# Patient Record
Sex: FEMALE | Race: BLACK OR AFRICAN AMERICAN | Employment: FULL TIME | URBAN - METROPOLITAN AREA
[De-identification: names, ages, dates, MRNs, and addresses within clinical notes are randomized per-mention and may not be internally consistent; named-entity substitution may affect disease eponyms.]

---

## 2020-05-05 ENCOUNTER — HOSPITAL ENCOUNTER (OUTPATIENT)
Dept: GENERAL RADIOLOGY | Age: 20
Discharge: HOME | End: 2020-05-05

## 2020-05-05 ENCOUNTER — HOSPITAL ENCOUNTER (OUTPATIENT)
Age: 20
Discharge: HOME | End: 2020-05-05

## 2020-05-05 PROCEDURE — 71046 X-RAY EXAM CHEST 2 VIEWS: CPT

## 2021-05-25 ENCOUNTER — HOSPITAL ENCOUNTER (OUTPATIENT)
Age: 21
Discharge: HOME | End: 2021-05-25
Payer: COMMERCIAL

## 2021-05-25 ENCOUNTER — OFFICE VISIT (OUTPATIENT)
Dept: BARIATRICS/WEIGHT MGMT | Age: 21
End: 2021-05-25
Payer: COMMERCIAL

## 2021-05-25 ENCOUNTER — HOSPITAL ENCOUNTER (OUTPATIENT)
Age: 21
Setting detail: SPECIMEN
Discharge: HOME | End: 2021-05-25
Payer: COMMERCIAL

## 2021-05-25 VITALS
WEIGHT: 262.3 LBS | HEART RATE: 96 BPM | SYSTOLIC BLOOD PRESSURE: 120 MMHG | OXYGEN SATURATION: 100 % | HEIGHT: 69 IN | BODY MASS INDEX: 38.85 KG/M2 | DIASTOLIC BLOOD PRESSURE: 80 MMHG

## 2021-05-25 DIAGNOSIS — Z79.899 MEDICATION MANAGEMENT: ICD-10-CM

## 2021-05-25 DIAGNOSIS — Z79.899 MEDICATION MANAGEMENT: Primary | ICD-10-CM

## 2021-05-25 DIAGNOSIS — E66.01 MORBID OBESITY DUE TO EXCESS CALORIES (HCC): ICD-10-CM

## 2021-05-25 LAB
ALBUMIN SERPL-MCNC: 4.2 GM/DL (ref 3.4–5)
ALP BLD-CCNC: 52 IU/L (ref 40–128)
ALT SERPL-CCNC: 10 U/L (ref 10–40)
AMPHETAMINES: NEGATIVE
ANION GAP SERPL CALCULATED.3IONS-SCNC: 10 MMOL/L (ref 4–16)
AST SERPL-CCNC: 12 IU/L (ref 15–37)
BARBITURATE SCREEN URINE: NEGATIVE
BASOPHILS ABSOLUTE: 0 K/CU MM
BASOPHILS RELATIVE PERCENT: 0.5 % (ref 0–1)
BENZODIAZEPINE SCREEN, URINE: NEGATIVE
BILIRUB SERPL-MCNC: 0.2 MG/DL (ref 0–1)
BUN BLDV-MCNC: 6 MG/DL (ref 6–23)
CALCIUM SERPL-MCNC: 9.6 MG/DL (ref 8.3–10.6)
CANNABINOID SCREEN URINE: NEGATIVE
CHLORIDE BLD-SCNC: 105 MMOL/L (ref 99–110)
CO2: 25 MMOL/L (ref 21–32)
COCAINE METABOLITE: NEGATIVE
CREAT SERPL-MCNC: 0.6 MG/DL (ref 0.6–1.1)
DIFFERENTIAL TYPE: ABNORMAL
EKG ATRIAL RATE: 80 BPM
EKG DIAGNOSIS: NORMAL
EKG P AXIS: 68 DEGREES
EKG P-R INTERVAL: 146 MS
EKG Q-T INTERVAL: 360 MS
EKG QRS DURATION: 74 MS
EKG QTC CALCULATION (BAZETT): 415 MS
EKG R AXIS: 60 DEGREES
EKG T AXIS: 36 DEGREES
EKG VENTRICULAR RATE: 80 BPM
EOSINOPHILS ABSOLUTE: 0.2 K/CU MM
EOSINOPHILS RELATIVE PERCENT: 2.7 % (ref 0–3)
GFR AFRICAN AMERICAN: >60 ML/MIN/1.73M2
GFR NON-AFRICAN AMERICAN: >60 ML/MIN/1.73M2
GLUCOSE BLD-MCNC: 88 MG/DL (ref 70–99)
HCT VFR BLD CALC: 41.6 % (ref 37–47)
HEMOGLOBIN: 13.2 GM/DL (ref 12.5–16)
IMMATURE NEUTROPHIL %: 0.3 % (ref 0–0.43)
INTERPRETATION: ABNORMAL
LYMPHOCYTES ABSOLUTE: 2 K/CU MM
LYMPHOCYTES RELATIVE PERCENT: 33.4 % (ref 24–44)
MCH RBC QN AUTO: 30 PG (ref 27–31)
MCHC RBC AUTO-ENTMCNC: 31.7 % (ref 32–36)
MCV RBC AUTO: 94.5 FL (ref 78–100)
MONOCYTES ABSOLUTE: 0.9 K/CU MM
MONOCYTES RELATIVE PERCENT: 14.8 % (ref 0–4)
NUCLEATED RBC %: 0 %
OPIATES, URINE: NEGATIVE
OXYCODONE: NEGATIVE
PDW BLD-RTO: 12.3 % (ref 11.7–14.9)
PHENCYCLIDINE, URINE: NEGATIVE
PLATELET # BLD: 308 K/CU MM (ref 140–440)
PMV BLD AUTO: 9.7 FL (ref 7.5–11.1)
POTASSIUM SERPL-SCNC: 4.2 MMOL/L (ref 3.5–5.1)
PREGNANCY, URINE: NEGATIVE
RBC # BLD: 4.4 M/CU MM (ref 4.2–5.4)
SEGMENTED NEUTROPHILS ABSOLUTE COUNT: 2.9 K/CU MM
SEGMENTED NEUTROPHILS RELATIVE PERCENT: 48.3 % (ref 36–66)
SODIUM BLD-SCNC: 140 MMOL/L (ref 135–145)
SPECIFIC GRAVITY, URINE: 1.04 (ref 1–1.03)
TOTAL IMMATURE NEUTOROPHIL: 0.02 K/CU MM
TOTAL NUCLEATED RBC: 0 K/CU MM
TOTAL PROTEIN: 7.6 GM/DL (ref 6.4–8.2)
WBC # BLD: 6 K/CU MM (ref 4–10.5)

## 2021-05-25 PROCEDURE — G8427 DOCREV CUR MEDS BY ELIG CLIN: HCPCS | Performed by: SURGERY

## 2021-05-25 PROCEDURE — 1036F TOBACCO NON-USER: CPT | Performed by: SURGERY

## 2021-05-25 PROCEDURE — 85025 COMPLETE CBC W/AUTO DIFF WBC: CPT

## 2021-05-25 PROCEDURE — 36415 COLL VENOUS BLD VENIPUNCTURE: CPT

## 2021-05-25 PROCEDURE — 81025 URINE PREGNANCY TEST: CPT

## 2021-05-25 PROCEDURE — 93005 ELECTROCARDIOGRAM TRACING: CPT | Performed by: SURGERY

## 2021-05-25 PROCEDURE — 99203 OFFICE O/P NEW LOW 30 MIN: CPT | Performed by: SURGERY

## 2021-05-25 PROCEDURE — 80053 COMPREHEN METABOLIC PANEL: CPT

## 2021-05-25 PROCEDURE — 93010 ELECTROCARDIOGRAM REPORT: CPT | Performed by: INTERNAL MEDICINE

## 2021-05-25 PROCEDURE — G8419 CALC BMI OUT NRM PARAM NOF/U: HCPCS | Performed by: SURGERY

## 2021-05-25 PROCEDURE — 80307 DRUG TEST PRSMV CHEM ANLYZR: CPT

## 2021-05-25 RX ORDER — BLOOD PRESSURE TEST KIT
KIT MISCELLANEOUS
Qty: 1 EACH | Refills: 0 | Status: SHIPPED | OUTPATIENT
Start: 2021-05-25 | End: 2021-08-24

## 2021-05-25 RX ORDER — LIRAGLUTIDE 6 MG/ML
INJECTION, SOLUTION SUBCUTANEOUS
Qty: 1 PEN | Refills: 0 | Status: SHIPPED | OUTPATIENT
Start: 2021-05-25 | End: 2021-08-24 | Stop reason: ALTCHOICE

## 2021-05-25 NOTE — PROGRESS NOTES
Subjective     Chief Complaint   Patient presents with    Weight Management     new [facility]        Vitals:    05/25/21 1007   BP: 120/80   Pulse: 96   SpO2: 100%     Wt Readings from Last 3 Encounters:   05/25/21 262 lb 4.8 oz (119 kg)         HPI:    Psychiatric history: denies  Cardiac history: denies  ETOH: denies  Drug use or history: denies  Exercise: not regularly   Current Diet measures: denies  Previous weight loss medications: adipex. Was on adipex two years ago for one week. Pt was on adipex for a week but had racing heart. History or seizure: denies  History of kidney stones: denies  History of eating disorders: denies  Contraception: pt had last depo shot in October but hasn't had a period yet. Domi's life is affected by weight related to below comorbid conditions. The patient has also tried self directed diet and exercise in attempts to sustain weight loss. Comorbid Conditions:  Significant diseases effecting this patient are History reviewed. No pertinent past medical history. .    Thoroughly reviewed the patient's medical history, family history, social history and review of systems with the patient today in the office. Please see medical record for pertinent positives. Allergies:  No Known Allergies    Past Surgical History:  History reviewed. No pertinent surgical history. Family History:  History reviewed. No pertinent family history.    + DM, +CA in father (prostate), +HTN,     Social History:  Social History     Socioeconomic History    Marital status: Single     Spouse name: Not on file    Number of children: Not on file    Years of education: Not on file    Highest education level: Not on file   Occupational History    Not on file   Tobacco Use    Smoking status: Never Smoker    Smokeless tobacco: Never Used   Substance and Sexual Activity    Alcohol use: Not on file    Drug use: Not on file    Sexual activity: Not on file   Other Topics Concern    Not on file Social History Narrative    Not on file     Social Determinants of Health     Financial Resource Strain:     Difficulty of Paying Living Expenses:    Food Insecurity:     Worried About Running Out of Food in the Last Year:     [address] in the Last Year:    Transportation Needs:     Lack of Transportation (Medical):  Lack of Transportation (Non-Medical):    Physical Activity:     Days of Exercise per Week:     Minutes of Exercise per Session:    Stress:     Feeling of Stress :    Social Connections:     Frequency of Communication with Friends and Family:     Frequency of Social Gatherings with Friends and Family:     Attends [employer] Services:     Active Member of Clubs or Organizations:     Attends Club or Organization Meetings:     Marital Status:    Intimate Partner Violence:     Fear of Current or Ex-Partner:     Emotionally Abused:     Physically Abused:     Sexually Abused:        Review of Systems - History obtained from the patient.     Review of Systems - General ROS: negative for - chills, fever, hot flashes or night sweats  ENT ROS: negative for - headaches, oral lesions, sore throat, vertigo or visual changes  Allergy and Immunology ROS: negative for - hives or nasal congestion  Endocrine ROS: negative for - hair pattern changes, mood swings or polydipsia/polyuria  Respiratory ROS: no cough, shortness of breath, or wheezing  Cardiovascular ROS: no chest pain or dyspnea on exertion  Gastrointestinal ROS: no abdominal pain, change in bowel habits, or black or bloody stools  Genito-Urinary ROS: no dysuria, incontinence, trouble voiding, or hematuria  Musculoskeletal ROS: Negative for joint pain or myalgia  Neurological ROS: negative for - behavioral changes, dizziness, headaches, impaired coordination/balance, numbness/tingling or seizures  Dermatological ROS: negative for - eczema or nail changes  Psychological ROS: negative for - anxiety, depression or suicidal ideation Objective     Physical Exam   Constitutional: Oriented to person, place, and time and well-developed, well-nourished, and in no distress. No distress. Obese   HENT:   Head: Normocephalic and atraumatic. Eyes: Conjunctivae are normal. Pupils are equal, round, and reactive to light. Neck: Normal range of motion. Neck supple. Cardiovascular: Normal rate, regular rhythm, normal heart sounds and intact distal pulses. No murmur heard. Pulmonary/Chest: Effort normal and breath sounds normal. No respiratory distress. Abdominal: Soft. Bowel sounds are normal. Exhibits no distension. There is no tenderness. Large. Musculoskeletal: Exhibits no edema or tenderness. Lymphadenopathy: Deferred. Neurological: Patient is alert and oriented to person, place, and time. No cranial nerve deficit. Skin: Skin is warm. Patient is not diaphoretic. Psychiatric: Mood, memory, affect and judgment normal.     Assessment  and Plan    Plan    1. Medication management  -first line plan is saxenda. Second line would be adipex. Orders placed for both in case not approved for saxenda. - POCT urine pregnancy  - DRUG SCREEN MULTI URINE; Future  - EKG 12 lead; Future  - CBC Auto Differential; Future  - COMPREHENSIVE METABOLIC PANEL; Future    2. Morbid obesity due to excess calories ([address]  -1200 mell / day  -Pt encouraged to continue exercising. Minimum goal of 150 minutes per week with ideal goal of 300 min per week. Exercise regimen should include at least 2-3 sessions per week of strength training. These recommendations are current with the most recent ASMBS guidelines. Obesity with a BMI of Body mass index is 38.73 kg/m². No history of current insulin use- or other Contraindications: family history of medullary Thyroid Carcinoma (MTC), multiple endocrine neoplasia syndrome, pregnancy, gastroparesis, problems with pancreas, kidneys, liver, depression and/or suicidal thoughts.  Was studied in >71 years of age and around Reason for Exam?     Answer: Other     Comments:   med mgt       Follow Up:  No follow-ups on file.     Mitch Townsend MD

## 2021-08-24 ENCOUNTER — HOSPITAL ENCOUNTER (OUTPATIENT)
Age: 21
Setting detail: SPECIMEN
Discharge: HOME | End: 2021-08-24
Payer: COMMERCIAL

## 2021-08-24 ENCOUNTER — OFFICE VISIT (OUTPATIENT)
Dept: OBGYN | Age: 21
End: 2021-08-24
Payer: COMMERCIAL

## 2021-08-24 VITALS
WEIGHT: 282 LBS | DIASTOLIC BLOOD PRESSURE: 76 MMHG | HEIGHT: 69 IN | BODY MASS INDEX: 41.77 KG/M2 | SYSTOLIC BLOOD PRESSURE: 129 MMHG

## 2021-08-24 DIAGNOSIS — E66.01 MORBID OBESITY (HCC): ICD-10-CM

## 2021-08-24 DIAGNOSIS — N92.6 IRREGULAR MENSES: ICD-10-CM

## 2021-08-24 DIAGNOSIS — A60.00 GENITAL HERPES SIMPLEX, UNSPECIFIED SITE: ICD-10-CM

## 2021-08-24 DIAGNOSIS — Z01.419 WOMEN'S ANNUAL ROUTINE GYNECOLOGICAL EXAMINATION: Primary | ICD-10-CM

## 2021-08-24 LAB
BASOPHILS ABSOLUTE: 0 K/UL (ref 0–0.2)
BASOPHILS RELATIVE PERCENT: 0.4 %
CHOLESTEROL, TOTAL: 140 MG/DL (ref 0–199)
EOSINOPHILS ABSOLUTE: 0.1 K/UL (ref 0–0.6)
EOSINOPHILS RELATIVE PERCENT: 1.5 %
FOLLICLE STIMULATING HORMONE: 6.2 MIU/ML
GLUCOSE BLD-MCNC: 75 MG/DL (ref 70–99)
HCT VFR BLD CALC: 37 % (ref 36–48)
HDLC SERPL-MCNC: 39 MG/DL (ref 40–60)
HEMOGLOBIN: 12.5 G/DL (ref 12–16)
LDL CHOLESTEROL CALCULATED: 82 MG/DL
LYMPHOCYTES ABSOLUTE: 2.4 K/UL (ref 1–5.1)
LYMPHOCYTES RELATIVE PERCENT: 37.4 %
MCH RBC QN AUTO: 31 PG (ref 26–34)
MCHC RBC AUTO-ENTMCNC: 33.8 G/DL (ref 31–36)
MCV RBC AUTO: 91.6 FL (ref 80–100)
MONOCYTES ABSOLUTE: 0.6 K/UL (ref 0–1.3)
MONOCYTES RELATIVE PERCENT: 10 %
NEUTROPHILS ABSOLUTE: 3.3 K/UL (ref 1.7–7.7)
NEUTROPHILS RELATIVE PERCENT: 50.7 %
PDW BLD-RTO: 13.3 % (ref 12.4–15.4)
PLATELET # BLD: 264 K/UL (ref 135–450)
PMV BLD AUTO: 8.1 FL (ref 5–10.5)
PROLACTIN: 11.6 NG/ML
RBC # BLD: 4.04 M/UL (ref 4–5.2)
TRIGL SERPL-MCNC: 93 MG/DL (ref 0–150)
TSH REFLEX FT4: 1.49 UIU/ML (ref 0.27–4.2)
VLDLC SERPL CALC-MCNC: 19 MG/DL
WBC # BLD: 6.5 K/UL (ref 4–11)

## 2021-08-24 PROCEDURE — 88142 CYTOPATH C/V THIN LAYER: CPT

## 2021-08-24 PROCEDURE — 87801 DETECT AGNT MULT DNA AMPLI: CPT

## 2021-08-24 PROCEDURE — 36415 COLL VENOUS BLD VENIPUNCTURE: CPT | Performed by: OBSTETRICS & GYNECOLOGY

## 2021-08-24 PROCEDURE — 99385 PREV VISIT NEW AGE 18-39: CPT | Performed by: OBSTETRICS & GYNECOLOGY

## 2021-08-24 RX ORDER — VALACYCLOVIR HYDROCHLORIDE 1 G/1
1000 TABLET, FILM COATED ORAL 2 TIMES DAILY
COMMUNITY
End: 2021-08-24

## 2021-08-24 RX ORDER — MEDROXYPROGESTERONE ACETATE 10 MG/1
20 TABLET ORAL DAILY
Qty: 20 TABLET | Refills: 0 | Status: SHIPPED | OUTPATIENT
Start: 2021-08-24 | End: 2022-07-26

## 2021-08-24 RX ORDER — VALACYCLOVIR HYDROCHLORIDE 1 G/1
1000 TABLET, FILM COATED ORAL DAILY
Qty: 90 TABLET | Refills: 3 | Status: SHIPPED | OUTPATIENT
Start: 2021-08-24 | End: 2022-07-26

## 2021-08-24 SDOH — ECONOMIC STABILITY: FOOD INSECURITY: WITHIN THE PAST 12 MONTHS, THE FOOD YOU BOUGHT JUST DIDN'T LAST AND YOU DIDN'T HAVE MONEY TO GET MORE.: NEVER TRUE

## 2021-08-24 SDOH — ECONOMIC STABILITY: FOOD INSECURITY: WITHIN THE PAST 12 MONTHS, YOU WORRIED THAT YOUR FOOD WOULD RUN OUT BEFORE YOU GOT MONEY TO BUY MORE.: NEVER TRUE

## 2021-08-24 ASSESSMENT — SOCIAL DETERMINANTS OF HEALTH (SDOH): HOW HARD IS IT FOR YOU TO PAY FOR THE VERY BASICS LIKE FOOD, HOUSING, MEDICAL CARE, AND HEATING?: NOT HARD AT ALL

## 2021-08-24 ASSESSMENT — PATIENT HEALTH QUESTIONNAIRE - PHQ9
2. FEELING DOWN, DEPRESSED OR HOPELESS: 0
SUM OF ALL RESPONSES TO PHQ9 QUESTIONS 1 & 2: 0
1. LITTLE INTEREST OR PLEASURE IN DOING THINGS: 0
SUM OF ALL RESPONSES TO PHQ QUESTIONS 1-9: 0

## 2021-08-24 ASSESSMENT — ENCOUNTER SYMPTOMS
ALLERGIC/IMMUNOLOGIC NEGATIVE: 1
RESPIRATORY NEGATIVE: 1
GASTROINTESTINAL NEGATIVE: 1
EYES NEGATIVE: 1

## 2021-08-24 NOTE — PROGRESS NOTES
8/24/21    Jann Dewitt  2000    Chief Complaint   Patient presents with    Gynecologic Exam     irregular menses, last depo injection was in October, not sexually active, unsure when last pap smear was, results normal,     Amenorrhea     last depo was in Feb/2020        The patient is a 23 y.o. female, No obstetric history on file. who presents for her annual exam.  She is not menstruating. .  She is  sexually active. She is not currently taking birth control    She reports additional symptoms of missed menses. .      Pap smear history: She has not had a PAP smear in the past.    History reviewed. No pertinent past medical history. History reviewed. No pertinent surgical history. History reviewed. No pertinent family history. Social History     Tobacco Use    Smoking status: Never Smoker    Smokeless tobacco: Never Used   Vaping Use    Vaping Use: Never used   Substance Use Topics    Alcohol use: Never    Drug use: Never       Current Outpatient Medications   Medication Sig Dispense Refill    valACYclovir (VALTREX) 1 g tablet Take 1 tablet by mouth daily 90 tablet 3    medroxyPROGESTERone (PROVERA) 10 MG tablet Take 2 tablets by mouth daily for 10 days 20 tablet 0     No current facility-administered medications for this visit. No Known Allergies    No obstetric history on file. There is no immunization history on file for this patient. Review of Systems   Constitutional: Negative. Eyes: Negative. Respiratory: Negative. Cardiovascular: Negative. Gastrointestinal: Negative. Endocrine: Negative. Genitourinary: Negative. Musculoskeletal: Negative. Skin: Negative. Allergic/Immunologic: Negative. Neurological: Negative. Hematological: Negative. Psychiatric/Behavioral: Negative. /76   Ht 5' 9\" (1.753 m)   Wt 282 lb (127.9 kg)   BMI 41.64 kg/m²     Physical Exam  Exam conducted with a chaperone present.    Constitutional: Appearance: Normal appearance. HENT:      Head: Normocephalic and atraumatic. Nose: Nose normal.   Eyes:      Conjunctiva/sclera: Conjunctivae normal.   Cardiovascular:      Rate and Rhythm: Normal rate. Pulses: Normal pulses. Pulmonary:      Effort: Pulmonary effort is normal.   Chest:      Breasts:         Right: Normal.         Left: Normal.   Abdominal:      General: Abdomen is flat. Bowel sounds are normal.      Palpations: Abdomen is soft. Hernia: There is no hernia in the left inguinal area or right inguinal area. Genitourinary:     General: Normal vulva. Exam position: Lithotomy position. Labia:         Right: No rash, tenderness or lesion. Left: No rash, tenderness or lesion. Urethra: No prolapse. Vagina: Normal. No foreign body. No vaginal discharge, erythema, tenderness, bleeding, lesions or prolapsed vaginal walls. Cervix: No cervical motion tenderness, discharge, friability, lesion, erythema or cervical bleeding. Uterus: Normal. Not enlarged, not tender and no uterine prolapse. Adnexa: Right adnexa normal and left adnexa normal.        Right: No mass, tenderness or fullness. Left: No mass, tenderness or fullness. Musculoskeletal:      Cervical back: Normal range of motion and neck supple. Lymphadenopathy:      Upper Body:      Right upper body: No supraclavicular, axillary or pectoral adenopathy. Left upper body: No supraclavicular, axillary or pectoral adenopathy. Skin:     General: Skin is warm. Coloration: Skin is not ashen or cyanotic. Findings: No abrasion, abscess or bruising. Rash is not crusting or macular. Neurological:      Mental Status: She is alert and oriented to person, place, and time. No results found for this visit on 08/24/21. Assessment and Plan   Diagnosis Orders   1.  Women's annual routine gynecological examination  C.trachomatis N.gonorrhoeae DNA, Thin Prep    PAP SMEAR 2. Morbid obesity (HCC)  Lipid Panel    TSH WITH REFLEX TO FT4    Glucose    Hemoglobin A1C   3. Irregular menses  CBC Auto Differential    DHEA-Sulfate    Insulin, total    Prolactin    TSH with Reflex    Testosterone, free, total    Follicle Stimulating Hormone    PROLACTIN   4. Genital herpes simplex, unspecified site       Follow up 1 month      Return in about 1 year (around 8/24/2022).     Dolores President, MD

## 2021-08-25 LAB
ESTIMATED AVERAGE GLUCOSE: 111.2 MG/DL
HBA1C MFR BLD: 5.5 %

## 2021-08-26 LAB
INSULIN COMMENT: NORMAL
INSULIN REFERENCE RANGE:: NORMAL
INSULIN: 42.4 MU/L
SEX HORMONE BINDING GLOBULIN: 16 NMOL/L (ref 30–135)
TESTOSTERONE FREE-NONMALE: 3.6 PG/ML (ref 0.8–7.4)
TESTOSTERONE TOTAL: 14 NG/DL (ref 20–70)

## 2021-08-27 LAB — DHEAS (DHEA SULFATE): 156 UG/DL (ref 65–380)

## 2021-08-28 LAB
CHLAMYDIA BY THIN PREP: NEGATIVE
GC BY THIN PREP: NEGATIVE

## 2021-09-27 ENCOUNTER — OFFICE VISIT (OUTPATIENT)
Dept: OBGYN | Age: 21
End: 2021-09-27
Payer: COMMERCIAL

## 2021-09-27 VITALS
WEIGHT: 276 LBS | SYSTOLIC BLOOD PRESSURE: 123 MMHG | HEIGHT: 69 IN | HEART RATE: 118 BPM | DIASTOLIC BLOOD PRESSURE: 81 MMHG | BODY MASS INDEX: 40.88 KG/M2

## 2021-09-27 DIAGNOSIS — Z87.42 HISTORY OF MENORRHAGIA: ICD-10-CM

## 2021-09-27 DIAGNOSIS — E66.01 MORBID OBESITY (HCC): ICD-10-CM

## 2021-09-27 DIAGNOSIS — N92.6 IRREGULAR MENSES: Primary | ICD-10-CM

## 2021-09-27 PROCEDURE — 99214 OFFICE O/P EST MOD 30 MIN: CPT | Performed by: OBSTETRICS & GYNECOLOGY

## 2021-09-27 RX ORDER — PHENTERMINE HYDROCHLORIDE 37.5 MG/1
37.5 TABLET ORAL
Qty: 84 TABLET | Refills: 3 | Status: SHIPPED | OUTPATIENT
Start: 2021-09-27 | End: 2021-10-26 | Stop reason: SDUPTHER

## 2021-09-27 RX ORDER — NORETHINDRONE ACETATE AND ETHINYL ESTRADIOL 1; .02 MG/1; MG/1
1 TABLET ORAL DAILY
Qty: 21 TABLET | Refills: 3 | Status: SHIPPED | OUTPATIENT
Start: 2021-09-27 | End: 2021-10-26 | Stop reason: SDUPTHER

## 2021-09-27 ASSESSMENT — ENCOUNTER SYMPTOMS
GASTROINTESTINAL NEGATIVE: 1
EYES NEGATIVE: 1
ALLERGIC/IMMUNOLOGIC NEGATIVE: 1
RESPIRATORY NEGATIVE: 1

## 2021-09-27 NOTE — PROGRESS NOTES
9/27/21    Esperanza Cody  2000    Chief Complaint   Patient presents with    Follow-up     pt here to discuss labs. pt would like to discuss weight.  Amenorrhea        Esperanza Cody is a 23 y.o. female who presents today for evaluation of morbid obesity and irregular menses. Is exercising and is eating healthy. Menses are rare. LMP 2 days prior but prior to this was 2 years prior. Was on depo provera. Also gained weight on depoprovera. Prior to depo provera menses were heavy but once month. Menses were bright red with clots, lasting 7-11 days    No past medical history on file. No past surgical history on file. Social History     Tobacco Use    Smoking status: Never Smoker    Smokeless tobacco: Never Used   Vaping Use    Vaping Use: Never used   Substance Use Topics    Alcohol use: Never    Drug use: Never       No family history on file. Current Outpatient Medications   Medication Sig Dispense Refill    phentermine (ADIPEX-P) 37.5 MG tablet Take 1 tablet by mouth every morning (before breakfast) for 30 days. bmi 40.76 84 tablet 3    norethindrone-ethinyl estradiol (LOESTRIN 1/20, 21,) 1-20 MG-MCG per tablet Take 1 tablet by mouth daily (For the first two pill packs, take for 3 week and then 1 week off pills. Starting with the third pill pack, take active pills continously) 21 tablet 3    valACYclovir (VALTREX) 1 g tablet Take 1 tablet by mouth daily 90 tablet 3    medroxyPROGESTERone (PROVERA) 10 MG tablet Take 2 tablets by mouth daily for 10 days 20 tablet 0     No current facility-administered medications for this visit. No Known Allergies    No obstetric history on file. There is no immunization history on file for this patient. Review of Systems   Constitutional: Negative. Eyes: Negative. Respiratory: Negative. Cardiovascular: Negative. Gastrointestinal: Negative. Endocrine: Negative. Genitourinary: Positive for menstrual problem. Musculoskeletal: Negative. Skin: Negative. Allergic/Immunologic: Negative. Neurological: Negative. Hematological: Negative. Psychiatric/Behavioral: Negative. /81 (Site: Right Upper Arm, Position: Sitting, Cuff Size: Medium Adult)   Pulse 118   Ht 5' 9\" (1.753 m)   Wt 276 lb (125.2 kg)   LMP 09/22/2021   BMI 40.76 kg/m²     Physical Exam  Constitutional:       General: She is not in acute distress. Appearance: Normal appearance. She is not ill-appearing. HENT:      Head: Normocephalic. Nose: Nose normal.   Eyes:      General: No scleral icterus. Right eye: No discharge. Left eye: No discharge. Cardiovascular:      Pulses: Normal pulses. Pulmonary:      Effort: Pulmonary effort is normal.   Abdominal:      General: Abdomen is flat. There is no distension. Palpations: Abdomen is soft. There is no mass. Tenderness: There is no abdominal tenderness. Hernia: No hernia is present. Musculoskeletal:         General: Normal range of motion. Cervical back: Normal range of motion and neck supple. No rigidity. Skin:     General: Skin is warm and dry. Neurological:      General: No focal deficit present. Mental Status: She is alert and oriented to person, place, and time.    Psychiatric:         Mood and Affect: Mood normal.         Behavior: Behavior normal.       ollected Updated Procedure    08/24/2021 1700 08/24/2021 2211 TSH WITH REFLEX TO FT4 [[id number]]    Blood    Component Value Units   TSH Reflex FT4 1.49 uIU/mL           08/24/2021 1700 08/24/2021 2209 Lipid Panel [[id number]]    (Abnormal)   Blood    Component Value Units   Cholesterol, Total 140 mg/dL   Triglycerides 93 mg/dL   HDL 39Low  mg/dL   LDL Calculated 82 mg/dL   VLDL Cholesterol Calculated 19 mg/dL           08/24/2021 1700 08/24/2021 2128 CBC Auto Differential [[id number]]    Blood    Component Value Units   WBC 6.5 K/uL   RBC 4.04 M/uL   Hemoglobin 12.5 g/dL Hematocrit 37.0 %   MCV 91.6 fL   MCH 31.0 pg   MCHC 33.8 g/dL   RDW 13.3 %   Platelets 437 K/uL   MPV 8.1 fL   Neutrophils % 50.7 %   Lymphocytes % 37.4 %   Monocytes % 10.0 %   Eosinophils % 1.5 %   Basophils % 0.4 %   Neutrophils Absolute 3.3 K/uL   Lymphocytes Absolute 2.4 K/uL   Monocytes Absolute 0.6 K/uL   Eosinophils Absolute 0.1 K/uL   Basophils Absolute 0.0 K/uL           08/24/2021 1700 08/24/2021 2209 Glucose [[id number]]    Blood    Component Value Units   Glucose 75 mg/dL           08/24/2021 1700 08/25/2021 0819 Hemoglobin A1C [[id number]]    Blood    Component Value Units   Hemoglobin A1C 5.5  %   eAG 111.2 mg/dL           08/24/2021 1700 08/27/2021 1253 DHEA-Sulfate [[id number]]    Blood    Component Value Units   DHEAS (DHEA Sulfate) 156.0  ug/dL           08/24/2021 1700 08/26/2021 1556 Insulin, total [[id number]]    Blood    Component Value Units   Insulin Comment na    Insulin 42.4 mU/L   Insulin Reference Range: SEE BELOW             08/24/2021 1700 08/24/2021 2209 Prolactin [[id number]]    Blood    Component Value Units   Prolactin 11.6  ng/mL           08/24/2021 1700 08/26/2021 1608 Testosterone, free, total [[id number]]    (Abnormal)   Blood    Component Value Units   Testosterone 14Low  ng/dL   Sex Hormone Binding 16Low  nmol/L   Testosterone, Free 3.6  pg/mL           08/24/2021 1700 87/61/5765 5917 Follicle Stimulating Hormone [[id number]]    Blood    Component Value Units   FSH 6.2  mIU/mL           08/24/2021 1727 08/31/2021 1140 GYN Cytology [[id number]]  Component Value   No component results               No results found for this visit on 09/27/21. ASSESSMENT AND PLAN   Diagnosis Orders   1. Irregular menses  norethindrone-ethinyl estradiol (LOESTRIN 1/20, 21,) 1-20 MG-MCG per tablet   2. History of menorrhagia  norethindrone-ethinyl estradiol (LOESTRIN 1/20, 21,) 1-20 MG-MCG per tablet   3.  Morbid obesity (HCC)  phentermine (ADIPEX-P) 37.5 MG tablet   options for aub discussed. She desires ocps, risk of ocps inclduing vte discussed    Diet and exercised discussed. Risk of mi and cva with adipex    Return in about 1 month (around 10/27/2021).     Jose Vides MD

## 2021-10-26 ENCOUNTER — OFFICE VISIT (OUTPATIENT)
Dept: OBGYN | Age: 21
End: 2021-10-26
Payer: COMMERCIAL

## 2021-10-26 VITALS
WEIGHT: 265 LBS | BODY MASS INDEX: 39.25 KG/M2 | HEIGHT: 69 IN | SYSTOLIC BLOOD PRESSURE: 137 MMHG | DIASTOLIC BLOOD PRESSURE: 86 MMHG

## 2021-10-26 DIAGNOSIS — N92.6 IRREGULAR MENSES: ICD-10-CM

## 2021-10-26 DIAGNOSIS — Z87.42 HISTORY OF MENORRHAGIA: ICD-10-CM

## 2021-10-26 DIAGNOSIS — E66.01 MORBID OBESITY (HCC): Primary | ICD-10-CM

## 2021-10-26 PROCEDURE — 99213 OFFICE O/P EST LOW 20 MIN: CPT | Performed by: OBSTETRICS & GYNECOLOGY

## 2021-10-26 RX ORDER — NORETHINDRONE ACETATE AND ETHINYL ESTRADIOL 1; .02 MG/1; MG/1
1 TABLET ORAL DAILY
Qty: 21 TABLET | Refills: 3 | Status: SHIPPED | OUTPATIENT
Start: 2021-10-26 | End: 2022-07-26

## 2021-10-26 RX ORDER — PHENTERMINE HYDROCHLORIDE 37.5 MG/1
37.5 TABLET ORAL
Qty: 84 TABLET | Refills: 3 | Status: SHIPPED | OUTPATIENT
Start: 2021-10-26 | End: 2021-11-23 | Stop reason: SDUPTHER

## 2021-10-26 ASSESSMENT — ENCOUNTER SYMPTOMS
RESPIRATORY NEGATIVE: 1
EYES NEGATIVE: 1
GASTROINTESTINAL NEGATIVE: 1
ALLERGIC/IMMUNOLOGIC NEGATIVE: 1

## 2021-10-26 NOTE — PROGRESS NOTES
10/26/21    Jolie Ulloa  2000    Chief Complaint   Patient presents with    Other     pt here for adipex refill, states eating healthy and exercising. Jolie Ulloa is a 23 y.o. female who presents today for evaluation of obesity. No side effects of medications    Is exercising 6 days per week  Is monitoring diet and counting calories. Past Medical History:   Diagnosis Date    Obesity        History reviewed. No pertinent surgical history. Social History     Tobacco Use    Smoking status: Never Smoker    Smokeless tobacco: Never Used   Vaping Use    Vaping Use: Never used   Substance Use Topics    Alcohol use: Never    Drug use: Never       Family History   Problem Relation Age of Onset    Prostate Cancer Paternal Grandfather     Ovarian Cancer Paternal Grandmother     Breast Cancer Paternal Grandmother     Diabetes Maternal Grandmother     Prostate Cancer Father     Breast Cancer Mother        Current Outpatient Medications   Medication Sig Dispense Refill    phentermine (ADIPEX-P) 37.5 MG tablet Take 1 tablet by mouth every morning (before breakfast) for 30 days. bmi 40.76 84 tablet 3    norethindrone-ethinyl estradiol (LOESTRIN 1/20, 21,) 1-20 MG-MCG per tablet Take 1 tablet by mouth daily (For the first two pill packs, take for 3 week and then 1 week off pills. Starting with the third pill pack, take active pills continously) 21 tablet 3    valACYclovir (VALTREX) 1 g tablet Take 1 tablet by mouth daily 90 tablet 3    medroxyPROGESTERone (PROVERA) 10 MG tablet Take 2 tablets by mouth daily for 10 days 20 tablet 0     No current facility-administered medications for this visit. Allergies   Allergen Reactions    Latex        No obstetric history on file. There is no immunization history on file for this patient. Review of Systems   Constitutional: Negative. Eyes: Negative. Respiratory: Negative. Cardiovascular: Negative.     Gastrointestinal: Negative. Endocrine: Negative. Genitourinary: Negative. Musculoskeletal: Negative. Skin: Negative. Allergic/Immunologic: Negative. Neurological: Negative. Hematological: Negative. Psychiatric/Behavioral: Negative.         /86   Ht 5' 9\" (1.753 m)   Wt 265 lb (120.2 kg)   LMP 10/11/2021   BMI 39.13 kg/m²     Physical Exam    No results found for this visit on 10/26/21.  08/24/2021 1730 08/28/2021 1729 Chlamydia trachomatis & GC by amplified detection [[id number]] Component Value   Gc By Thin Prep Negative    Chlamydia By Thin Prep Negative           08/24/2021 1727 08/31/2021 1140 GYN Cytology [[id number]]  Component Value   No component results           08/24/2021 1700 67/06/3723 0019 Follicle Stimulating Hormone [[id number]]    Blood    Component Value Units   FSH 6.2  mIU/mL           08/24/2021 1700 08/26/2021 1608 Testosterone, free, total [[id number]]    (Abnormal)   Blood    Component Value Units   Testosterone 14Low  ng/dL   Sex Hormone Binding 16Low  nmol/L   Testosterone, Free 3.6  pg/mL           08/24/2021 1700 08/24/2021 2209 Prolactin [[id number]]    Blood    Component Value Units   Prolactin 11.6  ng/mL           08/24/2021 1700 08/26/2021 1556 Insulin, total [[id number]]    Blood    Component Value Units   Insulin Comment na    Insulin 42.4 mU/L   Insulin Reference Range: SEE BELOW             08/24/2021 1700 08/27/2021 1253 DHEA-Sulfate [[id number]]    Blood    Component Value Units   DHEAS (DHEA Sulfate) 156.0  ug/dL           08/24/2021 1700 08/25/2021 0819 Hemoglobin A1C [[id number]]    Blood    Component Value Units   Hemoglobin A1C 5.5  %   eAG 111.2 mg/dL           08/24/2021 1700 08/24/2021 2209 Glucose [[id number]]    Blood    Component Value Units   Glucose 75 mg/dL           08/24/2021 1700 08/24/2021 2211 TSH WITH REFLEX TO FT4 [[id number]]    Blood    Component Value Units   TSH Reflex FT4 1.49 uIU/mL           08/24/2021 1700 08/24/2021 2204 Lipid Panel [[id number]]    (Abnormal)   Blood    Component Value Units   Cholesterol, Total 140 mg/dL   Triglycerides 93 mg/dL   HDL 39Low  mg/dL   LDL Calculated 82 mg/dL   VLDL Cholesterol Calculated 19 mg/dL           08/24/2021 1700 08/24/2021 2128 CBC Auto Differential [[id number]]    Blood    Component Value Units   WBC 6.5 K/uL   RBC 4.04 M/uL   Hemoglobin 12.5 g/dL   Hematocrit 37.0 %   MCV 91.6 fL   MCH 31.0 pg   MCHC 33.8 g/dL   RDW 13.3 %   Platelets 381 K/uL   MPV 8.1 fL   Neutrophils % 50.7 %   Lymphocytes % 37.4 %   Monocytes % 10.0 %   Eosinophils % 1.5 %   Basophils % 0.4 %   Neutrophils Absolute 3.3 K/uL   Lymphocytes Absolute 2.4 K/uL   Monocytes Absolute 0.6 K/uL   Eosinophils Absolute 0.1 K/uL   Basophils Absolute 0.0 K/uL             PROLACTIN [[id number]]   Blood    Component Value   No component results               ASSESSMENT AND PLAN   Diagnosis Orders   1. Morbid obesity (HCC)  phentermine (ADIPEX-P) 37.5 MG tablet   2. Irregular menses  norethindrone-ethinyl estradiol (LOESTRIN 1/20, 21,) 1-20 MG-MCG per tablet   3. History of menorrhagia  norethindrone-ethinyl estradiol (LOESTRIN 1/20, 21,) 1-20 MG-MCG per tablet     Discussed that lifestyle modification is the key to weight management. Risk of ocps including vte and breast cancer. She does have migraines but no aura. If migraines are worse on ocps, we will need to stop. Return in about 30 days (around 11/25/2021).     Anton Osborne MD

## 2021-11-23 ENCOUNTER — OFFICE VISIT (OUTPATIENT)
Dept: OBGYN | Age: 21
End: 2021-11-23

## 2021-11-23 VITALS
HEIGHT: 69 IN | WEIGHT: 251 LBS | SYSTOLIC BLOOD PRESSURE: 139 MMHG | BODY MASS INDEX: 37.18 KG/M2 | DIASTOLIC BLOOD PRESSURE: 85 MMHG

## 2021-11-23 DIAGNOSIS — E66.9 OBESITY (BMI 35.0-39.9 WITHOUT COMORBIDITY): Primary | ICD-10-CM

## 2021-11-23 DIAGNOSIS — E66.01 MORBID OBESITY (HCC): ICD-10-CM

## 2021-11-23 PROCEDURE — 99213 OFFICE O/P EST LOW 20 MIN: CPT | Performed by: OBSTETRICS & GYNECOLOGY

## 2021-11-23 RX ORDER — PHENTERMINE HYDROCHLORIDE 37.5 MG/1
37.5 TABLET ORAL
Qty: 84 TABLET | Refills: 3 | Status: SHIPPED | OUTPATIENT
Start: 2021-11-23 | End: 2021-12-23

## 2021-11-23 ASSESSMENT — ENCOUNTER SYMPTOMS
ALLERGIC/IMMUNOLOGIC NEGATIVE: 1
EYES NEGATIVE: 1
GASTROINTESTINAL NEGATIVE: 1
RESPIRATORY NEGATIVE: 1

## 2021-11-23 NOTE — PROGRESS NOTES
11/23/21    Vadim Prasad  2000    Chief Complaint   Patient presents with    Obesity     Pt here for third month of adipex, Pt states she is doing well on medication no sideeffects. Vadim Prasad is a 23 y.o. female who presents today for evaluation of obesity. Is exercising, is eating healthy. No problems with the adipex. Past Medical History:   Diagnosis Date    Obesity        No past surgical history on file. Social History     Tobacco Use    Smoking status: Never Smoker    Smokeless tobacco: Never Used   Vaping Use    Vaping Use: Never used   Substance Use Topics    Alcohol use: Never    Drug use: Never       Family History   Problem Relation Age of Onset    Prostate Cancer Paternal Grandfather     Ovarian Cancer Paternal Grandmother     Breast Cancer Paternal Grandmother     Diabetes Maternal Grandmother     Prostate Cancer Father     Breast Cancer Mother        Current Outpatient Medications   Medication Sig Dispense Refill    phentermine (ADIPEX-P) 37.5 MG tablet Take 1 tablet by mouth every morning (before breakfast) for 30 days. bmi 40.76 84 tablet 3    norethindrone-ethinyl estradiol (LOESTRIN 1/20, 21,) 1-20 MG-MCG per tablet Take 1 tablet by mouth daily (For the first two pill packs, take for 3 week and then 1 week off pills. Starting with the third pill pack, take active pills continously) 21 tablet 3    valACYclovir (VALTREX) 1 g tablet Take 1 tablet by mouth daily 90 tablet 3    medroxyPROGESTERone (PROVERA) 10 MG tablet Take 2 tablets by mouth daily for 10 days 20 tablet 0     No current facility-administered medications for this visit. Allergies   Allergen Reactions    Latex        No obstetric history on file. There is no immunization history on file for this patient. Review of Systems   Constitutional: Negative. Eyes: Negative. Respiratory: Negative. Cardiovascular: Negative. Gastrointestinal: Negative.     Endocrine:

## 2021-12-30 ENCOUNTER — OFFICE VISIT (OUTPATIENT)
Dept: OBGYN | Age: 21
End: 2021-12-30

## 2021-12-30 VITALS
BODY MASS INDEX: 35.25 KG/M2 | DIASTOLIC BLOOD PRESSURE: 85 MMHG | WEIGHT: 238 LBS | SYSTOLIC BLOOD PRESSURE: 141 MMHG | HEIGHT: 69 IN

## 2021-12-30 DIAGNOSIS — E66.9 OBESITY (BMI 35.0-39.9 WITHOUT COMORBIDITY): Primary | ICD-10-CM

## 2021-12-30 PROCEDURE — 99212 OFFICE O/P EST SF 10 MIN: CPT | Performed by: OBSTETRICS & GYNECOLOGY

## 2021-12-30 NOTE — PROGRESS NOTES
12/30/21    Monica Horton  2000    Chief Complaint   Patient presents with    Obesity     Pt just finished month 3 of adipex and         Monica Horton is a 19 y.o. female who presents today for evaluation of obesity      Past Medical History:   Diagnosis Date    Obesity        No past surgical history on file. Social History     Tobacco Use    Smoking status: Never Smoker    Smokeless tobacco: Never Used   Vaping Use    Vaping Use: Never used   Substance Use Topics    Alcohol use: Never    Drug use: Never       Family History   Problem Relation Age of Onset    Prostate Cancer Paternal Grandfather     Ovarian Cancer Paternal Grandmother     Breast Cancer Paternal Grandmother     Diabetes Maternal Grandmother     Prostate Cancer Father     Breast Cancer Mother        Current Outpatient Medications   Medication Sig Dispense Refill    norethindrone-ethinyl estradiol (Jenisameera Barnes 1/20, 21,) 1-20 MG-MCG per tablet Take 1 tablet by mouth daily (For the first two pill packs, take for 3 week and then 1 week off pills. Starting with the third pill pack, take active pills continously) 21 tablet 3    valACYclovir (VALTREX) 1 g tablet Take 1 tablet by mouth daily 90 tablet 3    medroxyPROGESTERone (PROVERA) 10 MG tablet Take 2 tablets by mouth daily for 10 days 20 tablet 0     No current facility-administered medications for this visit. Allergies   Allergen Reactions    Latex        No obstetric history on file. There is no immunization history on file for this patient. Review of Systems    BP (!) 141/85   Ht 5' 9\" (1.753 m)   Wt 238 lb (108 kg)   BMI 35.15 kg/m²     Physical Exam    No results found for this visit on 12/30/21. ASSESSMENT AND PLAN   Diagnosis Orders   1. Obesity (BMI 35.0-39.9 without comorbidity)         Return if symptoms worsen or fail to improve.   44 # weight loss with adipex  Discussed contrave, qsymia, other off label med    Most important is diet and exercise (lifestyle modification)    Follow up if fails to continue to lose weight    Olena Beard MD

## 2022-02-02 ENCOUNTER — OFFICE VISIT (OUTPATIENT)
Dept: OBGYN | Age: 22
End: 2022-02-02
Payer: COMMERCIAL

## 2022-02-02 DIAGNOSIS — Z11.3 SCREENING EXAMINATION FOR VENEREAL DISEASE: ICD-10-CM

## 2022-02-02 DIAGNOSIS — N92.6 IRREGULAR MENSES: Primary | ICD-10-CM

## 2022-02-02 PROCEDURE — 99202 OFFICE O/P NEW SF 15 MIN: CPT | Performed by: NURSE PRACTITIONER

## 2022-02-02 ASSESSMENT — ENCOUNTER SYMPTOMS
GASTROINTESTINAL NEGATIVE: 1
RESPIRATORY NEGATIVE: 1

## 2022-02-02 NOTE — PROGRESS NOTES
2/2/22    Amy Madrid  2000    Chief Complaint   Patient presents with    Vaginal Bleeding     Pt states she recently was using metro gel for BV, pt states it has caused her vaginal bleeding x5 days , pt then states she stopped the use for a cuople of days and then restarted and had bleeding again x 6 days. Amy Madrid is a 19 y.o. female who presents today for evaluation of irregular menses and std check    Past Medical History:   Diagnosis Date    Obesity        No past surgical history on file. Social History     Tobacco Use    Smoking status: Never Smoker    Smokeless tobacco: Never Used   Vaping Use    Vaping Use: Never used   Substance Use Topics    Alcohol use: Never    Drug use: Never       Family History   Problem Relation Age of Onset    Prostate Cancer Paternal Grandfather     Ovarian Cancer Paternal Grandmother     Breast Cancer Paternal Grandmother     Diabetes Maternal Grandmother     Prostate Cancer Father     Breast Cancer Mother        Current Outpatient Medications   Medication Sig Dispense Refill    norethindrone-ethinyl estradiol (Buzz Jammie 1/20, 21,) 1-20 MG-MCG per tablet Take 1 tablet by mouth daily (For the first two pill packs, take for 3 week and then 1 week off pills. Starting with the third pill pack, take active pills continously) 21 tablet 3    valACYclovir (VALTREX) 1 g tablet Take 1 tablet by mouth daily 90 tablet 3    medroxyPROGESTERone (PROVERA) 10 MG tablet Take 2 tablets by mouth daily for 10 days 20 tablet 0     No current facility-administered medications for this visit. Allergies   Allergen Reactions    Latex        No obstetric history on file. There is no immunization history on file for this patient. Review of Systems   Constitutional: Negative. Respiratory: Negative. Gastrointestinal: Negative. Genitourinary: Negative. There were no vitals taken for this visit.     Physical Exam  Vitals and nursing note reviewed. Constitutional:       Appearance: Normal appearance. She is normal weight. HENT:      Head: Normocephalic. Pulmonary:      Effort: Pulmonary effort is normal.   Abdominal:      Palpations: Abdomen is soft. Genitourinary:     General: Normal vulva. Vagina: Normal.      Cervix: Normal.      Uterus: Normal.       Adnexa: Right adnexa normal and left adnexa normal.      Rectum: Normal.   Musculoskeletal:         General: Normal range of motion. Cervical back: Normal range of motion. Skin:     General: Skin is warm and dry. Neurological:      Mental Status: She is alert. Psychiatric:         Attention and Perception: Attention normal.         Mood and Affect: Mood normal.         No results found for this visit on 02/02/22. ASSESSMENT AND PLAN   Diagnosis Orders   1. Irregular menses     2. Screening examination for venereal disease       Declines serum labs for STD panel  Condoms reinforced  Monitor cycles   Schedule annual     Return if symptoms worsen or fail to improve.     Dodie Neal, EARNEST - CNP

## 2022-02-03 LAB
C TRACH DNA GENITAL QL NAA+PROBE: NEGATIVE
CANDIDA SPECIES, DNA PROBE: NORMAL
GARDNERELLA VAGINALIS, DNA PROBE: NORMAL
N. GONORRHOEAE DNA: NEGATIVE
TRICHOMONAS VAGINALIS DNA: NORMAL

## 2022-06-29 ENCOUNTER — OFFICE VISIT (OUTPATIENT)
Dept: OBGYN | Age: 22
End: 2022-06-29
Payer: COMMERCIAL

## 2022-06-29 VITALS
WEIGHT: 229.4 LBS | HEIGHT: 69 IN | SYSTOLIC BLOOD PRESSURE: 119 MMHG | DIASTOLIC BLOOD PRESSURE: 78 MMHG | BODY MASS INDEX: 33.98 KG/M2

## 2022-06-29 DIAGNOSIS — Z11.3 SCREENING FOR STD (SEXUALLY TRANSMITTED DISEASE): Primary | ICD-10-CM

## 2022-06-29 DIAGNOSIS — N91.2 AMENORRHEA: ICD-10-CM

## 2022-06-29 DIAGNOSIS — N94.6 DYSMENORRHEA: ICD-10-CM

## 2022-06-29 DIAGNOSIS — N92.6 IRREGULAR MENSES: ICD-10-CM

## 2022-06-29 LAB
CONTROL: NORMAL
PREGNANCY TEST URINE, POC: NORMAL

## 2022-06-29 PROCEDURE — 36415 COLL VENOUS BLD VENIPUNCTURE: CPT

## 2022-06-29 PROCEDURE — 81025 URINE PREGNANCY TEST: CPT

## 2022-06-29 PROCEDURE — 99213 OFFICE O/P EST LOW 20 MIN: CPT

## 2022-06-29 ASSESSMENT — ENCOUNTER SYMPTOMS
GASTROINTESTINAL NEGATIVE: 1
ABDOMINAL PAIN: 0
RESPIRATORY NEGATIVE: 1

## 2022-06-29 NOTE — PROGRESS NOTES
6/29/22    Jena Tomlin  2000    Chief Complaint   Patient presents with    Exposure to STD     pt requesting std check.  Irregular Menses     pt c/o amenorrhea x 1 month, LMP-5/23/22. requesting bloodwork for hcg. Jena Tomlin is a 19 y.o. female who presents today for evaluation of STD check, also requesting serum hcg. Pt states her menses this month is about 8 days late, which is abnormal for her. She reports maybe 3-4 day variation in menses timing monthly, but this long of a delay has her concerned. Pt is not on any contraceptives, not interested at this time. Denies any STD symptoms (discharge, bleeding, pain, urinary symptoms). She reports breast tenderness, feeling of abdominal bloating as if she is menstruating. Past Medical History:   Diagnosis Date    Amenorrhea     Dysmenorrhea     Menorrhagia     Obesity     Screening for STD (sexually transmitted disease)        Past Surgical History:   Procedure Laterality Date    NECK SURGERY         Social History     Tobacco Use    Smoking status: Never Smoker    Smokeless tobacco: Never Used   Vaping Use    Vaping Use: Never used   Substance Use Topics    Alcohol use: Yes     Comment: occ    Drug use: Never       Family History   Problem Relation Age of Onset    Prostate Cancer Paternal Grandfather     Ovarian Cancer Paternal Grandmother     Breast Cancer Paternal Grandmother     Diabetes Maternal Grandmother     Breast Cancer Maternal Grandmother     Prostate Cancer Father     Breast Cancer Mother        Current Outpatient Medications   Medication Sig Dispense Refill    valACYclovir (VALTREX) 1 g tablet Take 1 tablet by mouth daily (Patient taking differently: Take 1,000 mg by mouth as needed ) 90 tablet 3    norethindrone-ethinyl estradiol (LOESTRIN 1/20, 21,) 1-20 MG-MCG per tablet Take 1 tablet by mouth daily (For the first two pill packs, take for 3 week and then 1 week off pills.   Starting with the third pill pack, take active pills continously) (Patient not taking: Reported on 2022) 21 tablet 3    medroxyPROGESTERone (PROVERA) 10 MG tablet Take 2 tablets by mouth daily for 10 days 20 tablet 0     No current facility-administered medications for this visit. Allergies   Allergen Reactions    Latex              There is no immunization history on file for this patient. Review of Systems   Constitutional: Negative. Negative for fatigue and fever. Respiratory: Negative. Gastrointestinal: Negative. Negative for abdominal pain. Endocrine: Negative. Genitourinary: Positive for menstrual problem. Negative for dysuria, frequency, pelvic pain, vaginal bleeding, vaginal discharge and vaginal pain. Musculoskeletal: Negative. Skin: Negative. Neurological: Negative. Negative for dizziness and headaches. Psychiatric/Behavioral: Negative. /78   Ht 5' 9\" (1.753 m)   Wt 229 lb 6.4 oz (104.1 kg)   LMP 2022   BMI 33.88 kg/m²     Physical Exam  Vitals and nursing note reviewed. Constitutional:       General: She is not in acute distress. Appearance: Normal appearance. She is obese. HENT:      Head: Normocephalic and atraumatic. Pulmonary:      Effort: Pulmonary effort is normal. No respiratory distress. Abdominal:      Palpations: Abdomen is soft. Tenderness: There is no abdominal tenderness. Genitourinary:     General: Normal vulva. Exam position: Lithotomy position. Vagina: Normal.      Cervix: Normal.      Uterus: Normal.       Adnexa: Right adnexa normal and left adnexa normal.   Musculoskeletal:         General: Normal range of motion. Cervical back: Normal range of motion. Skin:     General: Skin is warm and dry. Neurological:      General: No focal deficit present. Mental Status: She is alert and oriented to person, place, and time.    Psychiatric:         Mood and Affect: Mood normal.         Speech: Speech normal. Behavior: Behavior normal.         Thought Content: Thought content normal.         Results for orders placed or performed in visit on 06/29/22   POCT urine pregnancy   Result Value Ref Range    Preg Test, Ur neg     Control         ASSESSMENT AND PLAN   Diagnosis Orders   1. Screening for STD (sexually transmitted disease)  C.trachomatis N.gonorrhoeae DNA    Hepatitis B Surface Antigen    Herpes Simplex Virus,I/II,IgG    HIV Screen    Vaginal Pathogens Probes *A   2. Amenorrhea  POCT urine pregnancy    HCG Qualitative, Serum   3. Dysmenorrhea     4. Irregular menses       STD panel, hcg, swabs    Will wait for results to send medication - if hcg negative, pt to call/return if no bleeding by next week. All questions/concerns addressed    Return if symptoms worsen or fail to improve.     Anshul Feng PA-C

## 2022-06-30 DIAGNOSIS — B96.89 BACTERIAL VAGINOSIS: Primary | ICD-10-CM

## 2022-06-30 DIAGNOSIS — N76.0 BACTERIAL VAGINOSIS: Primary | ICD-10-CM

## 2022-06-30 LAB
CANDIDA SPECIES, DNA PROBE: ABNORMAL
GARDNERELLA VAGINALIS, DNA PROBE: ABNORMAL
HCG QUALITATIVE: NEGATIVE
HEPATITIS B SURFACE ANTIGEN INTERPRETATION: NORMAL
HERPES SIMPLEX VIRUS 1 IGG: NEGATIVE
HERPES SIMPLEX VIRUS 2 IGG: POSITIVE
HIV AG/AB: NORMAL
HIV ANTIGEN: NORMAL
HIV-1 ANTIBODY: NORMAL
HIV-2 AB: NORMAL
TOTAL SYPHILLIS IGG/IGM: NORMAL
TRICHOMONAS VAGINALIS DNA: ABNORMAL

## 2022-06-30 RX ORDER — METRONIDAZOLE 500 MG/1
500 TABLET ORAL 2 TIMES DAILY
Qty: 14 TABLET | Refills: 0 | Status: SHIPPED | OUTPATIENT
Start: 2022-06-30 | End: 2022-07-07

## 2022-07-01 LAB
C TRACH DNA GENITAL QL NAA+PROBE: NEGATIVE
N. GONORRHOEAE DNA: NEGATIVE

## 2022-07-26 ENCOUNTER — OFFICE VISIT (OUTPATIENT)
Dept: OBGYN | Age: 22
End: 2022-07-26
Payer: COMMERCIAL

## 2022-07-26 VITALS
DIASTOLIC BLOOD PRESSURE: 83 MMHG | HEIGHT: 69 IN | BODY MASS INDEX: 34.21 KG/M2 | WEIGHT: 231 LBS | SYSTOLIC BLOOD PRESSURE: 124 MMHG

## 2022-07-26 DIAGNOSIS — E66.9 OBESITY (BMI 30-39.9): Primary | ICD-10-CM

## 2022-07-26 PROCEDURE — 99213 OFFICE O/P EST LOW 20 MIN: CPT | Performed by: OBSTETRICS & GYNECOLOGY

## 2022-07-26 RX ORDER — PHENTERMINE HYDROCHLORIDE 37.5 MG/1
37.5 TABLET ORAL
Qty: 30 TABLET | Refills: 0 | Status: SHIPPED | OUTPATIENT
Start: 2022-07-26 | End: 2022-08-25

## 2022-07-26 ASSESSMENT — PATIENT HEALTH QUESTIONNAIRE - PHQ9
SUM OF ALL RESPONSES TO PHQ QUESTIONS 1-9: 0
SUM OF ALL RESPONSES TO PHQ9 QUESTIONS 1 & 2: 0
1. LITTLE INTEREST OR PLEASURE IN DOING THINGS: 0
2. FEELING DOWN, DEPRESSED OR HOPELESS: 0
SUM OF ALL RESPONSES TO PHQ QUESTIONS 1-9: 0

## 2022-07-26 NOTE — PROGRESS NOTES
22    Maria Del Rosario Rivas  2000    Chief Complaint   Patient presents with    Weight Management     Pt wants to discuss starting adipex. States eating healthy and exercising. Maria Del Rosario Rivas is a 19 y.o. female who presents today for evaluation of obesity. Is exercising, is eating healthy. Desires to join the [email or web address]. Past Medical History:   Diagnosis Date    Amenorrhea     Dysmenorrhea     Menorrhagia     Obesity     Screening for STD (sexually transmitted disease)        Past Surgical History:   Procedure Laterality Date    NECK SURGERY         Social History     Tobacco Use    Smoking status: Never    Smokeless tobacco: Never   Vaping Use    Vaping Use: Never used   Substance Use Topics    Alcohol use: Yes     Comment: occ    Drug use: Never       Family History   Problem Relation Age of Onset    Prostate Cancer Paternal Grandfather     Ovarian Cancer Paternal Grandmother     Breast Cancer Paternal Grandmother     Diabetes Maternal Grandmother     Breast Cancer Maternal Grandmother     Prostate Cancer Father     Breast Cancer Mother        Current Outpatient Medications   Medication Sig Dispense Refill    phentermine (ADIPEX-P) 37.5 MG tablet Take 1 tablet by mouth every morning (before breakfast) for 30 days. (BMI 34) 30 tablet 0     No current facility-administered medications for this visit. Allergies   Allergen Reactions    Latex              There is no immunization history on file for this patient. Review of Systems   Constitutional: Negative. HENT: Negative. Eyes: Negative. Respiratory: Negative. Cardiovascular: Negative. Gastrointestinal: Negative. Endocrine: Negative. Genitourinary: Negative. Musculoskeletal: Negative. Skin: Negative. Allergic/Immunologic: Negative. Neurological: Negative. Hematological: Negative. Psychiatric/Behavioral: Negative.        /83   Ht 5' 9\" (1.753 m)   Wt 231 lb (104.8 kg)   LMP 2022 BMI 34.11 kg/m²     Physical Exam  Constitutional:       General: She is not in acute distress. Appearance: Normal appearance. She is not ill-appearing. HENT:      Head: Normocephalic. Nose: Nose normal.   Eyes:      General: No scleral icterus. Right eye: No discharge. Left eye: No discharge. Cardiovascular:      Pulses: Normal pulses. Pulmonary:      Effort: Pulmonary effort is normal.   Abdominal:      General: Abdomen is flat. There is no distension. Palpations: Abdomen is soft. There is no mass. Tenderness: no abdominal tenderness      Hernia: No hernia is present. Musculoskeletal:         General: Normal range of motion. Cervical back: Normal range of motion and neck supple. No rigidity. Skin:     General: Skin is warm and dry. Neurological:      General: No focal deficit present. Mental Status: She is alert and oriented to person, place, and time. Psychiatric:         Mood and Affect: Mood normal.         Behavior: Behavior normal.       No results found for this visit on 07/26/22. ASSESSMENT AND PLAN   Diagnosis Orders   1. Obesity (BMI 30-39.9)  phentermine (ADIPEX-P) 37.5 MG tablet      2.  BMI 34.0-34.9,adult  phentermine (ADIPEX-P) 37.5 MG tablet        08/24/2021 1700 17/03/3313 0719 Follicle Stimulating Hormone [[id number]]    Blood    Component Value Units   FSH 6.2  mIU/mL          08/24/2021 1700 08/26/2021 1608 Testosterone, free, total [[id number]]    (Abnormal)   Blood    Component Value Units   Testosterone 14 Low  ng/dL   Sex Hormone Binding 16 Low  nmol/L   Testosterone, Free 3.6  pg/mL          08/24/2021 1700 08/24/2021 2209 Prolactin [[id number]]    Blood    Component Value Units   Prolactin 11.6  ng/mL          08/24/2021 1700 08/26/2021 1556 Insulin, total [[id number]]    Blood    Component Value Units   Insulin Comment na    Insulin 42.4 mU/L   Insulin Reference Range: SEE BELOW            08/24/2021 1700 08/27/2021 1253 DHEA-Sulfate [[id number]]    Blood    Component Value Units   DHEAS (DHEA Sulfate) 156.0  ug/dL          08/24/2021 1700 08/25/2021 0819 Hemoglobin A1C [[id number]]    Blood    Component Value Units   Hemoglobin A1C 5.5  %   eAG 111.2 mg/dL          08/24/2021 1700 08/24/2021 2209 Glucose [[id number]]    Blood    Component Value Units   Glucose 75 mg/dL          08/24/2021 1700 08/24/2021 2211 TSH WITH REFLEX TO FT4 [[id number]]    Blood    Component Value Units   TSH Reflex FT4 1.49 uIU/mL          08/24/2021 1700 08/24/2021 2209 Lipid Panel [[id number]]    (Abnormal)   Blood    Component Value Units   Cholesterol, Total 140 mg/dL   Triglycerides 93 mg/dL   HDL 39 Low  mg/dL   LDL Calculated 82 mg/dL   VLDL Cholesterol Calculated 19 mg/dL          08/24/2021 1700 08/24/2021 2128 CBC Auto Differential [[id number]]    Blood    Component Value Units   WBC 6.5 K/uL   RBC 4.04 M/uL   Hemoglobin 12.5 g/dL   Hematocrit 37.0 %   MCV 91.6 fL   MCH 31.0 pg   MCHC 33.8 g/dL   RDW 13.3 %   Platelets 195 K/uL   MPV 8.1 fL   Neutrophils % 50.7 %   Lymphocytes % 37.4 %   Monocytes % 10.0 %   Eosinophils % 1.5 %   Basophils % 0.4 %   Neutrophils Absolute 3.3 K/uL   Lymphocytes Absolute 2.4 K/uL   Monocytes Absolute 0.6 K/uL   Eosinophils Absolute 0.1 K/uL   Basophils Absolute 0.0 K/uL            Return in about 30 days (around 8/25/2022).     April Wood MD

## 2022-08-30 ENCOUNTER — OFFICE VISIT (OUTPATIENT)
Dept: OBGYN | Age: 22
End: 2022-08-30
Payer: COMMERCIAL

## 2022-08-30 VITALS
BODY MASS INDEX: 33.77 KG/M2 | WEIGHT: 228 LBS | HEIGHT: 69 IN | DIASTOLIC BLOOD PRESSURE: 83 MMHG | SYSTOLIC BLOOD PRESSURE: 128 MMHG

## 2022-08-30 DIAGNOSIS — A60.00 GENITAL HERPES SIMPLEX, UNSPECIFIED SITE: ICD-10-CM

## 2022-08-30 DIAGNOSIS — E66.9 OBESITY (BMI 30.0-34.9): ICD-10-CM

## 2022-08-30 PROCEDURE — 99213 OFFICE O/P EST LOW 20 MIN: CPT | Performed by: OBSTETRICS & GYNECOLOGY

## 2022-08-30 RX ORDER — PHENTERMINE HYDROCHLORIDE 37.5 MG/1
37.5 TABLET ORAL
Qty: 30 TABLET | Refills: 0 | Status: SHIPPED | OUTPATIENT
Start: 2022-08-30 | End: 2022-10-29

## 2022-08-30 RX ORDER — VALACYCLOVIR HYDROCHLORIDE 1 G/1
1000 TABLET, FILM COATED ORAL DAILY
Qty: 30 TABLET | Refills: 11 | Status: SHIPPED | OUTPATIENT
Start: 2022-08-30

## 2022-08-30 NOTE — PROGRESS NOTES
22    Levon Ignacio  2000    Chief Complaint   Patient presents with    Obesity     Pt here for 2 nd month of adipex, states is not eating healthy or exercising. Levon Ignacio is a 19 y.o. female who presents today for evaluation of obesity and rcurrent hsv    Is  exercissing. Is eating healthy. Past Medical History:   Diagnosis Date    Amenorrhea     Dysmenorrhea     Menorrhagia     Obesity     Screening for STD (sexually transmitted disease)        Past Surgical History:   Procedure Laterality Date    NECK SURGERY         Social History     Tobacco Use    Smoking status: Never    Smokeless tobacco: Never   Vaping Use    Vaping Use: Never used   Substance Use Topics    Alcohol use: Yes     Comment: occ    Drug use: Never       Family History   Problem Relation Age of Onset    Prostate Cancer Paternal Grandfather     Ovarian Cancer Paternal Grandmother     Breast Cancer Paternal Grandmother     Diabetes Maternal Grandmother     Breast Cancer Maternal Grandmother     Prostate Cancer Father     Breast Cancer Mother        Current Outpatient Medications   Medication Sig Dispense Refill    valACYclovir (VALTREX) 1 g tablet Take 1 tablet by mouth daily 30 tablet 11    phentermine (ADIPEX-P) 37.5 MG tablet Take 1 tablet by mouth every morning (before breakfast) for 60 days. (BMI 33) 30 tablet 0     No current facility-administered medications for this visit. Allergies   Allergen Reactions    Latex              There is no immunization history on file for this patient. Review of Systems    /83   Ht 5' 9\" (1.753 m)   Wt 228 lb (103.4 kg)   LMP 2022 (Exact Date)   BMI 33.67 kg/m²     Physical Exam    No results found for this visit on 22. ASSESSMENT AND PLAN   Diagnosis Orders   1. BMI 33.0-33.9,adult  phentermine (ADIPEX-P) 37.5 MG tablet      2. Obesity (BMI 30.0-34.9)  phentermine (ADIPEX-P) 37.5 MG tablet      3.  Genital herpes simplex, unspecified